# Patient Record
Sex: FEMALE | Race: OTHER | Employment: OTHER | URBAN - METROPOLITAN AREA
[De-identification: names, ages, dates, MRNs, and addresses within clinical notes are randomized per-mention and may not be internally consistent; named-entity substitution may affect disease eponyms.]

---

## 2017-09-11 ENCOUNTER — HOSPITAL ENCOUNTER (INPATIENT)
Age: 82
LOS: 2 days | Discharge: HOME | DRG: 378 | End: 2017-09-13
Attending: EMERGENCY MEDICINE | Admitting: INTERNAL MEDICINE
Payer: MEDICARE

## 2017-09-11 DIAGNOSIS — K62.5 RECTAL BLEEDING: Primary | ICD-10-CM

## 2017-09-11 PROBLEM — K92.2 ACUTE LOWER GASTROINTESTINAL BLEEDING: Status: ACTIVE | Noted: 2017-09-11

## 2017-09-11 LAB
ALBUMIN SERPL-MCNC: 2.3 G/DL (ref 3.2–4.6)
ALBUMIN/GLOB SERPL: 0.5 {RATIO} (ref 1.2–3.5)
ALP SERPL-CCNC: 93 U/L (ref 50–136)
ALT SERPL-CCNC: 19 U/L (ref 12–65)
ANION GAP SERPL CALC-SCNC: 10 MMOL/L (ref 7–16)
AST SERPL-CCNC: 20 U/L (ref 15–37)
BASOPHILS # BLD: 0.1 K/UL (ref 0–0.2)
BASOPHILS NFR BLD: 1 % (ref 0–2)
BILIRUB SERPL-MCNC: 0.2 MG/DL (ref 0.2–1.1)
BUN SERPL-MCNC: 25 MG/DL (ref 8–23)
CALCIUM SERPL-MCNC: 8.3 MG/DL (ref 8.3–10.4)
CHLORIDE SERPL-SCNC: 108 MMOL/L (ref 98–107)
CO2 SERPL-SCNC: 26 MMOL/L (ref 21–32)
CREAT SERPL-MCNC: 1.21 MG/DL (ref 0.6–1)
DIFFERENTIAL METHOD BLD: ABNORMAL
EOSINOPHIL # BLD: 1.2 K/UL (ref 0–0.8)
EOSINOPHIL NFR BLD: 10 % (ref 0.5–7.8)
ERYTHROCYTE [DISTWIDTH] IN BLOOD BY AUTOMATED COUNT: 16.7 % (ref 11.9–14.6)
GLOBULIN SER CALC-MCNC: 4.3 G/DL (ref 2.3–3.5)
GLUCOSE SERPL-MCNC: 142 MG/DL (ref 65–100)
HCT VFR BLD AUTO: 28.4 % (ref 35.8–46.3)
HGB BLD-MCNC: 8.8 G/DL (ref 11.7–15.4)
IMM GRANULOCYTES # BLD: 0 K/UL (ref 0–0.5)
IMM GRANULOCYTES NFR BLD: 0.2 % (ref 0–5)
LYMPHOCYTES # BLD: 2.3 K/UL (ref 0.5–4.6)
LYMPHOCYTES NFR BLD: 19 % (ref 13–44)
MCH RBC QN AUTO: 23 PG (ref 26.1–32.9)
MCHC RBC AUTO-ENTMCNC: 31 G/DL (ref 31.4–35)
MCV RBC AUTO: 74.3 FL (ref 79.6–97.8)
MONOCYTES # BLD: 0.8 K/UL (ref 0.1–1.3)
MONOCYTES NFR BLD: 7 % (ref 4–12)
NEUTS SEG # BLD: 7.8 K/UL (ref 1.7–8.2)
NEUTS SEG NFR BLD: 63 % (ref 43–78)
PLATELET # BLD AUTO: 349 K/UL (ref 150–450)
PMV BLD AUTO: 9.4 FL (ref 10.8–14.1)
POTASSIUM SERPL-SCNC: 4.4 MMOL/L (ref 3.5–5.1)
PROT SERPL-MCNC: 6.6 G/DL (ref 6.3–8.2)
RBC # BLD AUTO: 3.82 M/UL (ref 4.05–5.25)
SODIUM SERPL-SCNC: 144 MMOL/L (ref 136–145)
WBC # BLD AUTO: 12.1 K/UL (ref 4.3–11.1)

## 2017-09-11 PROCEDURE — 99284 EMERGENCY DEPT VISIT MOD MDM: CPT | Performed by: EMERGENCY MEDICINE

## 2017-09-11 PROCEDURE — 99283 EMERGENCY DEPT VISIT LOW MDM: CPT | Performed by: EMERGENCY MEDICINE

## 2017-09-11 PROCEDURE — 85025 COMPLETE CBC W/AUTO DIFF WBC: CPT | Performed by: EMERGENCY MEDICINE

## 2017-09-11 PROCEDURE — 93005 ELECTROCARDIOGRAM TRACING: CPT | Performed by: EMERGENCY MEDICINE

## 2017-09-11 PROCEDURE — 74011250637 HC RX REV CODE- 250/637: Performed by: INTERNAL MEDICINE

## 2017-09-11 PROCEDURE — 74011250636 HC RX REV CODE- 250/636: Performed by: INTERNAL MEDICINE

## 2017-09-11 PROCEDURE — 80053 COMPREHEN METABOLIC PANEL: CPT | Performed by: EMERGENCY MEDICINE

## 2017-09-11 PROCEDURE — 65660000000 HC RM CCU STEPDOWN

## 2017-09-11 PROCEDURE — 77030032490 HC SLV COMPR SCD KNE COVD -B

## 2017-09-11 RX ORDER — SODIUM CHLORIDE 9 MG/ML
75 INJECTION, SOLUTION INTRAVENOUS CONTINUOUS
Status: DISCONTINUED | OUTPATIENT
Start: 2017-09-11 | End: 2017-09-13 | Stop reason: HOSPADM

## 2017-09-11 RX ORDER — CLOPIDOGREL BISULFATE 75 MG/1
75 TABLET ORAL DAILY
COMMUNITY

## 2017-09-11 RX ORDER — MIRTAZAPINE 45 MG/1
45 TABLET, FILM COATED ORAL
COMMUNITY

## 2017-09-11 RX ORDER — PROMETHAZINE HYDROCHLORIDE 25 MG/ML
12.5 INJECTION, SOLUTION INTRAMUSCULAR; INTRAVENOUS
Status: DISCONTINUED | OUTPATIENT
Start: 2017-09-11 | End: 2017-09-13 | Stop reason: HOSPADM

## 2017-09-11 RX ORDER — ONDANSETRON 2 MG/ML
4 INJECTION INTRAMUSCULAR; INTRAVENOUS
Status: DISCONTINUED | OUTPATIENT
Start: 2017-09-11 | End: 2017-09-13 | Stop reason: HOSPADM

## 2017-09-11 RX ORDER — FUROSEMIDE 20 MG/1
20 TABLET ORAL DAILY
COMMUNITY

## 2017-09-11 RX ORDER — ASPIRIN 81 MG/1
81 TABLET ORAL DAILY
COMMUNITY

## 2017-09-11 RX ORDER — METOPROLOL TARTRATE 50 MG/1
50 TABLET ORAL 2 TIMES DAILY
COMMUNITY

## 2017-09-11 RX ORDER — RANITIDINE 150 MG/1
150 TABLET, FILM COATED ORAL 2 TIMES DAILY
COMMUNITY

## 2017-09-11 RX ORDER — SODIUM CHLORIDE 0.9 % (FLUSH) 0.9 %
5-10 SYRINGE (ML) INJECTION AS NEEDED
Status: DISCONTINUED | OUTPATIENT
Start: 2017-09-11 | End: 2017-09-13 | Stop reason: HOSPADM

## 2017-09-11 RX ORDER — SODIUM CHLORIDE 0.9 % (FLUSH) 0.9 %
5-10 SYRINGE (ML) INJECTION EVERY 8 HOURS
Status: DISCONTINUED | OUTPATIENT
Start: 2017-09-11 | End: 2017-09-13 | Stop reason: HOSPADM

## 2017-09-11 RX ORDER — ATORVASTATIN CALCIUM 10 MG/1
TABLET, FILM COATED ORAL DAILY
COMMUNITY

## 2017-09-11 RX ORDER — PANTOPRAZOLE SODIUM 40 MG/1
40 TABLET, DELAYED RELEASE ORAL
Status: DISCONTINUED | OUTPATIENT
Start: 2017-09-12 | End: 2017-09-13 | Stop reason: HOSPADM

## 2017-09-11 RX ORDER — QUETIAPINE FUMARATE 100 MG/1
100 TABLET, FILM COATED ORAL
COMMUNITY

## 2017-09-11 RX ORDER — ATORVASTATIN CALCIUM 10 MG/1
10 TABLET, FILM COATED ORAL
Status: DISCONTINUED | OUTPATIENT
Start: 2017-09-11 | End: 2017-09-13 | Stop reason: HOSPADM

## 2017-09-11 RX ORDER — MECLIZINE HCL 12.5 MG 12.5 MG/1
12.5 TABLET ORAL
COMMUNITY

## 2017-09-11 RX ADMIN — ATORVASTATIN CALCIUM 10 MG: 10 TABLET, FILM COATED ORAL at 22:33

## 2017-09-11 RX ADMIN — Medication 10 ML: at 18:25

## 2017-09-11 RX ADMIN — SODIUM CHLORIDE 75 ML/HR: 900 INJECTION, SOLUTION INTRAVENOUS at 18:22

## 2017-09-11 NOTE — IP AVS SNAPSHOT
Summary of Care Report The Summary of Care report has been created to help improve care coordination. Users with access to [employer] or [address] (Web-based application) may access additional patient information including the Discharge Summary. If you are not currently a [address] user and need more information, please call the number listed below in the [place] [address] section and ask to be connected with Medical Records. Facility Information Name Address Phone [address] [address] [address] [phone] Patient Information Patient Name Sex DOMINIQUE Stiles ([id number]) Female 1935 Discharge Information Admitting Provider Service Area Unit Aniya Dailey MD / [address] 2 Surgical / [phone] Discharge Provider Discharge Date/Time Discharge Disposition Destination (none) 2017 (Pending) AHR (none) Patient Language Language Turkish [40] Hospital Problems as of 2017  Never Reviewed Class Noted - Resolved Last Modified POA Active Problems * (Principal)Acute lower gastrointestinal bleeding  2017 - Present 2017 by Tiffany Steve MD Unknown Entered by Aniya Dailey MD  
  Acute blood loss anemia  2017 - Present 2017 by Tiffany Steve MD Yes Entered by Tiffany Steve MD  
  
You are allergic to the following No active allergies Current Discharge Medication List  
  
CONTINUE these medications which have NOT CHANGED Dose & Instructions Dispensing Information Comments  
 aspirin delayed-release 81 mg tablet Dose:  81 mg Take 81 mg by mouth daily. Refills:  0  
   
 clopidogrel 75 mg Tab Commonly known as:  PLAVIX Dose:  75 mg Take 75 mg by mouth daily. Refills:  0 LASIX 20 mg tablet Generic drug:  furosemide Dose:  20 mg Take 20 mg by mouth daily. Refills:  0 LIPITOR 10 mg tablet Generic drug:  atorvastatin Take  by mouth daily. Refills:  0 LOPRESSOR 50 mg tablet Generic drug:  metoprolol tartrate Dose:  50 mg Take 50 mg by mouth two (2) times a day. Refills:  0  
   
 meclizine 12.5 mg tablet Commonly known as:  ANTIVERT Dose:  12.5 mg Take 12.5 mg by mouth three (3) times daily as needed. Refills:  0  
   
 mirtazapine 45 mg tablet Commonly known as:  [place] People Dose:  45 mg Take 45 mg by mouth nightly. Refills:  0 SEROquel 100 mg tablet Generic drug:  QUEtiapine Dose:  100 mg Take 100 mg by mouth nightly. Refills:  0  
   
 ZANTAC 150 mg tablet Generic drug:  raNITIdine Dose:  150 mg Take 150 mg by mouth two (2) times a day. Refills:  0 Follow-up Information Follow up With Details Comments Contact Info Not On File [facility]   Not On File (62) Patient has a PCP but that physician is not listed in [facility]. 
  
 keep your appointment with your GI doctor Discharge Instructions DISCHARGE SUMMARY from Nurse The following personal items are in your possession at time of discharge: 
 
Dental Appliances: None Home Medications: None Jewelry: None Clothing: With patient, Undergarments, Socks, Shirt, Pants, Footwear Other Valuables: At bedside PATIENT INSTRUCTIONS: 
 
After general anesthesia or intravenous sedation, for 24 hours or while taking prescription Narcotics: · Limit your activities · Do not drive and operate hazardous machinery · Do not make important personal or business decisions · Do  not drink alcoholic beverages · If you have not urinated within 8 hours after discharge, please contact your surgeon on call. Report the following to your surgeon: · Excessive pain, swelling, redness or odor of or around the surgical area · Temperature over 100.5 · Nausea and vomiting lasting longer than 4 hours or if unable to take medications · Any signs of decreased circulation or nerve impairment to extremity: change in color, persistent  numbness, tingling, coldness or increase pain · Any questions What to do at Home: 
Recommended activity: Activity as tolerated, If you experience any of the following symptoms fever, chills, new or unrelieved pain, dizziness, chest pain, shortness of breath, bleeding , please follow up with MD. 
 
 
*  Please give a list of your current medications to your Primary Care Provider. *  Please update this list whenever your medications are discontinued, doses are 
    changed, or new medications (including over-the-counter products) are added. *  Please carry medication information at all times in case of emergency situations. These are general instructions for a healthy lifestyle: No smoking/ No tobacco products/ Avoid exposure to second hand smoke Surgeon General's Warning:  Quitting smoking now greatly reduces serious risk to your health. Obesity, smoking, and sedentary lifestyle greatly increases your risk for illness A healthy diet, regular physical exercise & weight monitoring are important for maintaining a healthy lifestyle You may be retaining fluid if you have a history of heart failure or if you experience any of the following symptoms:  Weight gain of 3 pounds or more overnight or 5 pounds in a week, increased swelling in our hands or feet or shortness of breath while lying flat in bed. Please call your doctor as soon as you notice any of these symptoms; do not wait until your next office visit. Recognize signs and symptoms of STROKE: 
 
F-face looks uneven A-arms unable to move or move unevenly S-speech slurred or non-existent T-time-call 911 as soon as signs and symptoms begin-DO NOT go Back to bed or wait to see if you get better-TIME IS BRAIN. Warning Signs of HEART ATTACK Call 911 if you have these symptoms: 
? Chest discomfort. Most heart attacks involve discomfort in the center of the chest that lasts more than a few minutes, or that goes away and comes back. It can feel like uncomfortable pressure, squeezing, fullness, or pain. ? Discomfort in other areas of the upper body. Symptoms can include pain or discomfort in one or both arms, the back, neck, jaw, or stomach. ? Shortness of breath with or without chest discomfort. ? Other signs may include breaking out in a cold sweat, nausea, or lightheadedness. Don't wait more than five minutes to call [address]! Fast action can save your life. Calling 911 is almost always the fastest way to get lifesaving treatment. Emergency Medical Services staff can begin treatment when they arrive  up to an hour sooner than if someone gets to the hospital by car. The discharge information has been reviewed with the patient. The patient verbalized understanding. Discharge medications reviewed with the patient and appropriate educational materials and side effects teaching were provided. Chart Review Routing History No Routing History on File

## 2017-09-11 NOTE — IP AVS SNAPSHOT
Jas Alexander 
 
 
 [address] [facility] [address] [facility] 
[phone] Patient: Savannah Vallejo MRN: [id number] HARSH:[id number] You are allergic to the following No active allergies Recent Documentation Smoking Status Never Assessed Emergency Contacts Name Discharge Info Relation Home Work Mobile Tomas Quesada  Daughter [21] [phone] About your hospitalization You were admitted on:  September 11, 2017 You last received care in the:  [facility] 2 SURGICAL You were discharged on:  September 13, 2017 Unit phone number:  [phone] Why you were hospitalized Your primary diagnosis was:  Acute Lower Gastrointestinal Bleeding Your diagnoses also included:  Acute Blood Loss Anemia Providers Seen During Your Hospitalizations Provider Role Specialty Primary office phone Tor Cabral MD Attending Provider Emergency Medicine [phone] Vijay Arnold MD Attending Provider Internal Medicine [phone] Your Primary Care Physician (PCP) Primary Care Physician Office Phone Office Fax NOT ON FILE ** None ** ** None ** Follow-up Information Follow up With Details Comments Contact Info Not On File Bshsi   Not On File (62) Patient has a PCP but that physician is not listed in [facility]. 
  
 keep your appointment with your GI doctor Current Discharge Medication List  
  
CONTINUE these medications which have NOT CHANGED Dose & Instructions Dispensing Information Comments Morning Noon Evening Bedtime  
 aspirin delayed-release 81 mg tablet Your next dose is:  Tomorrow Morning Dose:  81 mg Take 81 mg by mouth daily. Refills:  0  
     
  
   
   
   
  
 clopidogrel 75 mg Tab Commonly known as:  PLAVIX Your next dose is:  Tomorrow Morning Dose:  75 mg Take 75 mg by mouth daily. Refills:  0  
     
  
   
   
   
  
 LASIX 20 mg tablet Generic drug:  furosemide Your next dose is:  Tomorrow Morning Dose:  20 mg Take 20 mg by mouth daily. Refills:  0 LIPITOR 10 mg tablet Generic drug:  atorvastatin Your next dose is:  Tomorrow Morning Take  by mouth daily. Refills:  0 LOPRESSOR 50 mg tablet Generic drug:  metoprolol tartrate Your next dose is: This evening Dose:  50 mg Take 50 mg by mouth two (2) times a day. Refills:  0  
     
  
   
   
  
   
  
 meclizine 12.5 mg tablet Commonly known as:  ANTIVERT Your next dose is: This evening Dose:  12.5 mg Take 12.5 mg by mouth three (3) times daily as needed. Refills:  0  
     
  
   
  
   
  
   
  
 mirtazapine 45 mg tablet Commonly known as:  [place]lyn [place] Your next dose is: This evening Dose:  45 mg Take 45 mg by mouth nightly. Refills:  0 SEROquel 100 mg tablet Generic drug:  QUEtiapine Your next dose is: This evening Dose:  100 mg Take 100 mg by mouth nightly. Refills:  0  
     
   
   
  
   
  
 ZANTAC 150 mg tablet Generic drug:  raNITIdine Your next dose is: This evening Dose:  150 mg Take 150 mg by mouth two (2) times a day. Refills:  0 Discharge Instructions DISCHARGE SUMMARY from Nurse The following personal items are in your possession at time of discharge: 
 
Dental Appliances: None Home Medications: None Jewelry: None Clothing: With patient, Undergarments, Socks, Shirt, Pants, Footwear Other Valuables: At bedside PATIENT INSTRUCTIONS: 
 
After general anesthesia or intravenous sedation, for 24 hours or while taking prescription Narcotics: · Limit your activities · Do not drive and operate hazardous machinery · Do not make important personal or business decisions · Do  not drink alcoholic beverages · If you have not urinated within 8 hours after discharge, please contact your surgeon on call. Report the following to your surgeon: 
· Excessive pain, swelling, redness or odor of or around the surgical area · Temperature over 100.5 · Nausea and vomiting lasting longer than 4 hours or if unable to take medications · Any signs of decreased circulation or nerve impairment to extremity: change in color, persistent  numbness, tingling, coldness or increase pain · Any questions What to do at Home: 
Recommended activity: Activity as tolerated, If you experience any of the following symptoms fever, chills, new or unrelieved pain, dizziness, chest pain, shortness of breath, bleeding , please follow up with MD. 
 
 
*  Please give a list of your current medications to your Primary Care Provider. *  Please update this list whenever your medications are discontinued, doses are 
    changed, or new medications (including over-the-counter products) are added. *  Please carry medication information at all times in case of emergency situations. These are general instructions for a healthy lifestyle: No smoking/ No tobacco products/ Avoid exposure to second hand smoke Surgeon General's Warning:  Quitting smoking now greatly reduces serious risk to your health. Obesity, smoking, and sedentary lifestyle greatly increases your risk for illness A healthy diet, regular physical exercise & weight monitoring are important for maintaining a healthy lifestyle You may be retaining fluid if you have a history of heart failure or if you experience any of the following symptoms:  Weight gain of 3 pounds or more overnight or 5 pounds in a week, increased swelling in our hands or feet or shortness of breath while lying flat in bed.   Please call your doctor as soon as you notice any of these symptoms; do not wait until your next office visit. Recognize signs and symptoms of STROKE: 
 
F-face looks uneven A-arms unable to move or move unevenly S-speech slurred or non-existent T-time-call 911 as soon as signs and symptoms begin-DO NOT go Back to bed or wait to see if you get better-TIME IS BRAIN. Warning Signs of HEART ATTACK Call 911 if you have these symptoms: 
? Chest discomfort. Most heart attacks involve discomfort in the center of the chest that lasts more than a few minutes, or that goes away and comes back. It can feel like uncomfortable pressure, squeezing, fullness, or pain. ? Discomfort in other areas of the upper body. Symptoms can include pain or discomfort in one or both arms, the back, neck, jaw, or stomach. ? Shortness of breath with or without chest discomfort. ? Other signs may include breaking out in a cold sweat, nausea, or lightheadedness. Don't wait more than five minutes to call [address]! Fast action can save your life. Calling 911 is almost always the fastest way to get lifesaving treatment. Emergency Medical Services staff can begin treatment when they arrive  up to an hour sooner than if someone gets to the hospital by car. The discharge information has been reviewed with the patient. The patient verbalized understanding. Discharge medications reviewed with the patient and appropriate educational materials and side effects teaching were provided. Discharge Instructions Attachments/References GI BLEED (Kazakh) Discharge Orders None [facility] Announcement We are excited to announce that we are making your provider's discharge notes available to you in [place]. You will see these notes when they are completed and signed by the physician that discharged you from your recent hospital stay.   If you have any questions or concerns about any information you see in MyChart, please call the Health Information Department where you were seen or reach out to your Primary Care Provider for more information about your plan of care. Introducing [facility] HEALTH SERVICES! [facility] introduce portal paciente MyChart . Ahora se puede acceder a partes de brasher expediente médico, enviar por correo electrónico la oficina de brasher médico y solicitar renovaciones de medicamentos en línea. En brasher navegador de Internet , [employer] a [email or web address] Jaren clic en el usuario por Yaneli Sarmiento? Eli Ligas clic aquí en la sesión Latisha Lehman. Verá la página de registro [place]. Ingrese brasher código de [facility] Cortney vincenzo y bret aparece a continuación. Usted no tendrá que [employer] código después de antonio completado el proceso de registro . Si usted no se inscribe antes de la fecha de caducidad , debe solicitar un nuevo código. · MyChart Código de acceso : [id number] Expires: 12/11/2017  9:32 AM 
 
Ingresa los últimos cuatro dígitos de brasher Número de Seguro Social ( xxxx ) y fecha de nacimiento ( dd / mm / aaaa ) bret se indica y jaren clic en Enviar. Usted será llevado a la siguiente página de registro . Crear un ID MyChart . Esta será brasher ID de inicio de sesión de MyChart y no puede ser [place] , por lo que pensar en amparo que es Deneise Pea y fácil de recordar . Crear amparo contraseña MyChart . Usted puede cambiar brasher contraseña en cualquier momento . Ingrese brasher Password Reset de preguntas y Hankins . [place] se puede utilizar en un momento posterior si usted olvida brasher contraseña. Introduzca brasher dirección de correo electrónico . Crystal Maidens recibirá amparo notificación por correo electrónico cuando la nueva información está disponible en MyChart . Felicie Abler clic en Registrarse. Nika Ing stephanie y descargar porciones de brasher expediente médico. 
Jaren clic en el enlace de descarga del menú Resumen para descargar amparo copia portátil de brasher información médica . [employer] , por favor visite la sección de preguntas frecuentes del sitio web MyChart . Kelli Snyder NO es que se utilizará para las necesidades urgentes. Para emergencias médicas , joycelyn al 911 . Ahora disponible en brasher iPhone y Android ! General Information Please provide this summary of care documentation to your next provider. Patient Signature:  ____________________________________________________________ Date:  ____________________________________________________________  
  
Bill Schrader Provider Signature:  ____________________________________________________________ Date:  ____________________________________________________________ More Information Gastrointestinal Bleeding: Care Instructions Your Care Instructions The digestive or gastrointestinal tract goes from the mouth to the anus. It is often called the GI tract. Bleeding can happen anywhere in the GI tract. It may be caused by an ulcer, an infection, or cancer. It may also be caused by medicines such as aspirin or ibuprofen. Light bleeding may not cause any symptoms at first. But if you continue to bleed for a while, you may feel very weak or tired. Sudden, heavy bleeding means you need to see a doctor right away. This kind of bleeding can be very dangerous. But it can usually be cured or controlled. The doctor may do some tests to find the cause of your bleeding. Follow-up care is a key part of your treatment and safety. Be sure to make and go to all appointments, and call your doctor if you are having problems. It's also a good idea to know your test results and keep a list of the medicines you take. How can you care for yourself at home? · Be safe with medicines. Take your medicines exactly as prescribed. Call your doctor if you think you are having a problem with your medicine.  You will get more details on the specific medicines your doctor prescribes. · Do not take aspirin or other anti-inflammatory medicines, such as naproxen (Aleve) or ibuprofen (Advil, Motrin), without talking to your doctor first. Ask your doctor if it is okay to use acetaminophen (Tylenol). · Do not drink alcohol. · The bleeding may make you lose iron. So it's important to eat foods that have a lot of iron. These include red meat, shellfish, poultry, and eggs. They also include beans, raisins, whole-grain breads, and leafy green vegetables. If you want help planning meals, you can make an appointment with a dietitian. When should you call for help? Call 911 anytime you think you may need emergency care. For example, call if: 
· You have sudden, severe belly pain. · You vomit blood or what looks like coffee grounds. · You passed out (lost consciousness). · Your stools are maroon or very bloody. Call your doctor now or seek immediate medical care if: 
· You are dizzy or lightheaded, or you feel like you may faint. · Your stools are black and look like tar, or they have streaks of blood. · You have belly pain. · You vomit or have nausea. · You have trouble swallowing, or it hurts when you swallow. Watch closely for changes in your health, and be sure to contact your doctor if: 
· You do not get better as expected. Where can you learn more? Go to http://ivania-xiang.info/. Enter W472 in the search box to learn more about \"Gastrointestinal Bleeding: Care Instructions. \" Current as of: March 20, 2017 Content Version: 11.3 © 6005-2479 [employer]. Care instructions adapted under license by [employer] (which disclaims liability or warranty for this information). If you have questions about a medical condition or this instruction, always ask your healthcare professional. [address] any warranty or liability for your use of this information. Izzy Max 
 
 
 [address] [facility] [address] [facility] 
[phone] Patient: Nancy Oropeza MRN: [id number] MHB:[id number] Tiene alergias a lo siguiente No tiene alergias Documentación recientes Estatus de tabaquísmo Never Assessed Emergency Contacts Name Discharge Info Relation Home Work Mobile Henry Liu  Daughter [21] [phone] Sobre mathis hospitalización Le admitieron el:  September 11, 2017 Mathis tratamiento más reciente fue el:  [facility] 2 SURGICAL Le dieron de andi el:  September 13, 2017 Número de teléfono de la unidad:  [phone] Por qué le ingresaron Mathis diagnosis primaria es:  Acute Lower Gastrointestinal Bleeding Mathis diagnosis también incluye:  Acute Blood Loss Anemia Proveedores de verse ion piedad hospitalizaciones Personal Médico Rol Especialidad Teléfono principal de la oficina Kaur Payne MD Attending Provider Emergency Medicine [phone] Barbie Chi MD Attending Provider Internal Medicine [phone] Mathis médico de atención primaria (PCP ) Primary Care Physician Office Phone Office Fax NOT ON FILE ** None ** ** None ** Follow-up Information Follow up With Details Comments Contact Info Not On File Bshsi   Not On File (62) Patient has a PCP but that physician is not listed in [facility]. 
  
 keep your appointment with your GI doctor Aprobación de la gestión actual lista de medicamentos CONTINUAR estos medicamentos que no [place] Dosis e instrucciones Información de dispensación Comentarios Morning Noon Evening Bedtime  
 aspirin delayed-release 81 mg tablet Your next dose is:  Tomorrow Morning Dosis:  81 mg Take 81 mg by mouth daily. recargas:  0  
     
  
   
   
   
  
 clopidogrel 75 mg Tab También conocido bret:  PLAVIX Your next dose is:  Tomorrow Morning Dosis:  75 mg Take 75 mg by mouth daily. recargas:  0  
     
  
   
   
   
  
 LASIX 20 mg tablet Medicamento genérico:  furosemide Your next dose is:  Tomorrow Morning Dosis:  20 mg Take 20 mg by mouth daily. recargas:  0 LIPITOR 10 mg tablet Medicamento genérico:  atorvastatin Your next dose is:  Tomorrow Morning Take  by mouth daily. recargas:  0 LOPRESSOR 50 mg tablet Medicamento genérico:  metoprolol tartrate Your next dose is: This evening Dosis:  50 mg Take 50 mg by mouth two (2) times a day. recargas:  0  
     
  
   
   
  
   
  
 meclizine 12.5 mg tablet También conocido bret:  ANTIVERT Your next dose is: This evening Dosis:  12.5 mg Take 12.5 mg by mouth three (3) times daily as needed. recargas:  0  
     
  
   
  
   
  
   
  
 mirtazapine 45 mg tablet También conocido bret:  [place] Your next dose is: This evening Dosis:  45 mg Take 45 mg by mouth nightly. recargas:  0 SEROquel 100 mg tablet Medicamento genérico:  QUEtiapine Your next dose is: This evening Dosis:  100 mg Take 100 mg by mouth nightly. recargas:  0  
     
   
   
  
   
  
 ZANTAC 150 mg tablet Medicamento genérico:  raNITIdine Your next dose is: This evening Dosis:  150 mg Take 150 mg by mouth two (2) times a day. recargas:  0 Discharge Instructions DISCHARGE SUMMARY from Nurse The following personal items are in your possession at time of discharge: 
 
Dental Appliances: None Home Medications: None Jewelry: None Clothing: With patient, Undergarments, Socks, Shirt, Pants, Footwear Other Valuables: At bedside PATIENT INSTRUCTIONS: 
 
 After general anesthesia or intravenous sedation, for 24 hours or while taking prescription Narcotics: · Limit your activities · Do not drive and operate hazardous machinery · Do not make important personal or business decisions · Do  not drink alcoholic beverages · If you have not urinated within 8 hours after discharge, please contact your surgeon on call. Report the following to your surgeon: 
· Excessive pain, swelling, redness or odor of or around the surgical area · Temperature over 100.5 · Nausea and vomiting lasting longer than 4 hours or if unable to take medications · Any signs of decreased circulation or nerve impairment to extremity: change in color, persistent  numbness, tingling, coldness or increase pain · Any questions What to do at Home: 
Recommended activity: Activity as tolerated, If you experience any of the following symptoms fever, chills, new or unrelieved pain, dizziness, chest pain, shortness of breath, bleeding , please follow up with MD. 
 
 
*  Please give a list of your current medications to your Primary Care Provider. *  Please update this list whenever your medications are discontinued, doses are 
    changed, or new medications (including over-the-counter products) are added. *  Please carry medication information at all times in case of emergency situations. These are general instructions for a healthy lifestyle: No smoking/ No tobacco products/ Avoid exposure to second hand smoke Surgeon General's Warning:  Quitting smoking now greatly reduces serious risk to your health. Obesity, smoking, and sedentary lifestyle greatly increases your risk for illness A healthy diet, regular physical exercise & weight monitoring are important for maintaining a healthy lifestyle You may be retaining fluid if you have a history of heart failure or if you experience any of the following symptoms:  Weight gain of 3 pounds or more overnight or 5 pounds in a week, increased swelling in our hands or feet or shortness of breath while lying flat in bed. Please call your doctor as soon as you notice any of these symptoms; do not wait until your next office visit. Recognize signs and symptoms of STROKE: 
 
F-face looks uneven A-arms unable to move or move unevenly S-speech slurred or non-existent T-time-call 911 as soon as signs and symptoms begin-DO NOT go Back to bed or wait to see if you get better-TIME IS BRAIN. Warning Signs of HEART ATTACK Call 911 if you have these symptoms: 
? Chest discomfort. Most heart attacks involve discomfort in the center of the chest that lasts more than a few minutes, or that goes away and comes back. It can feel like uncomfortable pressure, squeezing, fullness, or pain. ? Discomfort in other areas of the upper body. Symptoms can include pain or discomfort in one or both arms, the back, neck, jaw, or stomach. ? Shortness of breath with or without chest discomfort. ? Other signs may include breaking out in a cold sweat, nausea, or lightheadedness. Don't wait more than five minutes to call [address]! Fast action can save your life. Calling 911 is almost always the fastest way to get lifesaving treatment. Emergency Medical Services staff can begin treatment when they arrive  up to an hour sooner than if someone gets to the hospital by car. The discharge information has been reviewed with the patient. The patient verbalized understanding. Discharge medications reviewed with the patient and appropriate educational materials and side effects teaching were provided. Discharge Instructions Attachments/References GI BLEED (Lithuanian) Discharge Orders [employer] [employer] Announcement We are excited to announce that we are making your provider's discharge notes available to you in [employer].   You will see these notes when they are completed and signed by the physician that discharged you from your recent hospital stay. If you have any questions or concerns about any information you see in MyChart, please call the Health Information Department where you were seen or reach out to your Primary Care Provider for more information about your plan of care. Introducing [facility] HEALTH SERVICES! Maxim Ventura introduce portal paciente MyChart . Ahora se puede acceder a partes de brasher expediente médico, enviar por correo electrónico la oficina de brasher médico y solicitar renovaciones de medicamentos en línea. En brasher navegador de Internet , Diana Martinez a [email or web address] Jaren clic en el usuario por Naheed Butter? [place] clic aquí en la sesión [facility]. Verá la página de registro [place]. Ingrese brasher código de [facility] Cortney vincenzo y bret aparece a continuación. Usted no tendrá que [employer] código después de antonio completado el proceso de registro . Si usted no se inscribe antes de la fecha de caducidad , debe solicitar un nuevo código. · MyChart Código de acceso : [id number] Expires: 12/11/2017  9:32 AM 
 
Ingresa los últimos cuatro dígitos de brasher Número de Seguro Social ( xxxx ) y fecha de nacimiento ( dd / mm / aaaa ) bret se indica y jaren clic en Enviar. Usted será llevado a la siguiente página de registro . Crear un ID MyChart . Esta será brasher ID de inicio de sesión de MyChart y no puede ser [place] , por lo que pensar en amparo que es Lizzy Yusef y fácil de recordar . Crear amparo contraseña MyChart . Usted puede cambiar brasher contraseña en cualquier momento . Ingrese brasher Password Reset de preguntas y [place] . [place] se puede utilizar en un momento posterior si usted olvida brasher contraseña. Introduzca brasher dirección de correo electrónico . Jessica Vee recibirá amparo notificación por correo electrónico cuando la nueva información está disponible en MyChart . Cheron Sobia blanco en Registrarse.  Lesly Milling stephanie y descargar porciones de brasher expediente Jermaine blanco en el enlace de descarga del menú Resumen para descargar amparo copia portátil de brasher información médica . Si tiene [employer] , por favor visite la sección de preguntas frecuentes del sitio web MyChart . Recuerde, MyChart NO es que se utilizará para las necesidades urgentes. Para emergencias médicas , llame al 911 . Ahora disponible en brasher iPhone y Android ! General Information Please provide this summary of care documentation to your next provider. Patient Signature:  ____________________________________________________________ Date:  ____________________________________________________________  
  
[place] Brake Provider Signature:  ____________________________________________________________ Date:  ____________________________________________________________ More Information Sangrado gastrointestinal: Instrucciones de cuidado - [ Gastrointestinal Bleeding: Care Instructions ] Instrucciones de cuidado El tubo digestivo o gastrointestinal se extiende desde la boca hasta el ano. El sangrado puede suceder en cualquier punto del tubo digestivo. Puede ser causado por amparo úlcera, amparo infección o cáncer. También puede ser causado por medicamentos bret aspirina o ibuprofeno. Es posible que un sangrado leve no cause síntomas al principio. Anna si sigue sangrando por un tiempo, podría sentirse muy débil o cansado. Un sangrado repentino y abundante significa que debe visitar al médico de inmediato. Renu tipo de sangrado puede ser [employer] Anna, por lo general, se puede curar o controlar. El médico querrá realizar algunas pruebas para determinar la causa del sangrado. La atención de seguimiento es amparo parte clave de brasher tratamiento y seguridad. Asegúrese de hacer y acudir a todas las citas, y llame a brasher médico si está teniendo problemas.  También es amparo buena idea [employer] resultados de los exámenes y mantener amparo lista de los medicamentos que bandar. Cómo puede cuidarse en el hogar? · Sea vignesh con los medicamentos. [place] khushbu medicamentos exactamente bret le fueron recetados. Llame a brasher médico si maynor estar teniendo problemas con brasher medicamento. [place] medicamentos específicos recetados por brasher médico. 
· No tome aspirina ni otros medicamentos antiinflamatorios, bret naproxeno (Aleve) o ibuprofeno (Advil, Motrin) sin hablar antes con brasher médico. Pregúntele a brasher médico si puede mary acetaminofén (Tylenol). · No rafael alcohol. · Es posible que el sangrado le cause deficiencia de pau. Por lo tanto, es importante que coma alimentos que contengan mucho pau. Estos incluyen alisha nieto, mariscos, aves y [place] frijoles (habichuelas), uvas pasas, panes integrales y verduras de [employer]. Si quiere ayuda para planificar khushbu comidas, puede hacer amparo stas con un dietista. Cuándo debe pedir ayuda? Llame al 911 en cualquier momento que considere que necesita atención de [place]. Por ejemplo, llame si: · Tiene dolor abdominal repentino e intenso. · Vomita lori o algo parecido a granos de café molido. · Se desmayó (perdió el conocimiento). · Khushbu heces son de color amarronado rojizo o muy sanguinolentas (con lori). Llame a brasher médico ahora mismo o busque atención médica inmediata si: 
· Se siente mareado o aturdido, o siente que se va a desmayar. · Khushbu heces son negruzcas y parecidas al alquitrán o tienen rastros de [place]. · Tiene dolor abdominal. 
· Vomita o tiene náuseas. · Tiene dificultades para tragar o dolor al hacerlo. Preste especial atención a los cambios en brasher leyla y asegúrese de comunicarse con brasher médico si: 
· No mejora bret se esperaba. Dónde puede encontrar más información en inglés? [place] a http://ivania-xiang.info/.  
Bk Carrasco V875 en la búsqueda para aprender más acerca Almanzar gastrointestinal: Instrucciones de cuidado - [ Gastrointestinal Bleeding: Care Instructions ]. \" 
Revisado: 20 marzo, 2017 Versión del contenido: 11.3 © 0576-8839 Healthwise, Incorporated. Las instrucciones de cuidado fueron adaptadas bajo licencia por [employer] (which disclaims liability or warranty for this information). Si usted tiene [place] afección médica o sobre estas instrucciones, siempre pregunte a brasher profesional de leyla. [place], [place] niega toda garantía o responsabilidad por brasher uso de esta información.
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
## 2017-09-11 NOTE — ED NOTES
TRANSFER - OUT REPORT:    Verbal report given to DANIE Olivier on Eunice Lopez  being transferred to [facility] for routine progression of care       Report consisted of patients Situation, Background, Assessment and   Recommendations(SBAR). Information from the following report(s) SBAR, Kardex, ED Summary, Procedure Summary, Intake/Output and MAR was reviewed with the receiving nurse. Lines:   Peripheral IV 09/11/17 Left Antecubital (Active)   Site Assessment Clean, dry, & intact 9/11/2017  5:04 PM   Phlebitis Assessment 0 9/11/2017  5:04 PM   Infiltration Assessment 0 9/11/2017  5:04 PM   Dressing Status Clean, dry, & intact 9/11/2017  5:04 PM        Opportunity for questions and clarification was provided.       Patient transported with:   [employer]

## 2017-09-11 NOTE — H&P
History and Physical    Patient: Rosibel Guzman MRN: [id number]  SSN: [id number]    YOB: 1935  Age: 80 y.o. Sex: female      Subjective:    I am a native Romanian speaker- Granddaughter used as  as well ( she speaks [place] ). Rosibel Guzman is a 80 y.o. female who has PMH of CAD, Valve replacement, pacemaker, HTN, Alzheimer's dementia who was brought in by family members ( daughter and granddaughter ) after she had > 6 bloody bowel movements today. Family members showed me pictures they took at home. There was a moderate amount of blood clots and bright red blood. They live in [place] and happen to be visiting [place]. According to her  Daughter, this is the 3rd episode of lower GI. Last almost 1 year ago back in [place]. Patient had a recent admission 1 week ago at [place] due to weakness and diarrhea. EGD/Colonoscopy showed diverticular disease. Flagyl given. Upon arrival to ED VS were /67  HR 60  02 97%, Hb 8 ( unknown baseline ). Normal chemistry. Complaining of nausea. GI consulted with possible Endoscopic studies today vs tomorrow. Denies hematemesis, melena, vomiting, weight loss, history of cancer, no recent NSAIDs use. Past Medical History:   Diagnosis Date    CAD (coronary artery disease)     Dementia     Hypertension      Past Surgical History:   Procedure Laterality Date    HX PACEMAKER        History reviewed. No pertinent family history. Social History   Substance Use Topics    Smoking status: Not on file    Smokeless tobacco: Not on file    Alcohol use No      Prior to Admission medications    Not on File        No Known Allergies    Review of Systems:  A comprehensive review of systems was negative except for that written in the History of Present Illness.     Objective:     Vitals:    09/11/17 1531 09/11/17 1645 09/11/17 1659 09/11/17 1702   BP: 145/67 135/66 136/75    Pulse: 60 60 60    Resp: 18 (!) 40 19    Temp:   98.4 °F (36.9 °C)    SpO2: 97% 98% 98% 97%        Physical Exam:  GENERAL: alert, cooperative, no distress, appears stated age  EYE: negative, positive findings: conjunctivae: PALE    LYMPHATIC: Cervical, supraclavicular, and axillary nodes normal.   THROAT & NECK: no erythema or exudates noted. LUNG: clear to auscultation bilaterally  HEART: regular rate and rhythm, S1, S2 normal, no murmur, click, rub or gallop  ABDOMEN: Mild distension, soft, non-tender. Bowel sounds normal. No masses,  no organomegaly  EXTREMITIES:  extremities normal, atraumatic, no cyanosis or edema  SKIN: Normal.  NEUROLOGIC: alert, in no distress   PSYCHIATRIC: non focal  RECTAL: normal findings:  no gross blood. But when I checked bedside commode, I noticed bright red blood. Assessment:     Hospital Problems  Never Reviewed          Codes Class Noted POA    Acute lower gastrointestinal bleeding ICD-10-CM: K92.2  ICD-9-CM: 578.9  9/11/2017 Unknown              Plan:     #Acute lower GI bleeding: hemodynamically stable  Patient with 3rd episode of GI bleeding, last 1 year ago  Diagnosis of diverticular disease  Physical: mild abdominal distension, non tender, no rebound  Hb of 8.8gr  Keep NPO  Continue IVF, PPI  zofran if vomiting   monItior CBC q 6hrs and transfuse if Hb < 7gr  GI following- for Colonoscopy    #CAD / Heart valve replacement  #Pacemaker-  On aspirin and plavix-  Used to be on coumadin but discontinue due to episodes of hematuria  Hold for now  On toprol, hold for now. May resume it if SBP > 160 or DBP > 100mmHg    #Alzheimers dementia: stable    #DVT ppx: venodyne boots.  Not on heparin    FULL CODE     Diet: NPO  Estimated LOS: > 2 MN  Risk assessment: moderate  Plan of care: discussed with family members        Signed By: Lisa Thornton MD     September 11, 2017
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
## 2017-09-12 LAB
ALBUMIN SERPL-MCNC: 2.2 G/DL (ref 3.2–4.6)
ALBUMIN/GLOB SERPL: 0.6 {RATIO} (ref 1.2–3.5)
ALP SERPL-CCNC: 82 U/L (ref 50–136)
ALT SERPL-CCNC: 16 U/L (ref 12–65)
ANION GAP SERPL CALC-SCNC: 8 MMOL/L (ref 7–16)
AST SERPL-CCNC: 17 U/L (ref 15–37)
ATRIAL RATE: 127 BPM
BILIRUB SERPL-MCNC: 0.2 MG/DL (ref 0.2–1.1)
BUN SERPL-MCNC: 30 MG/DL (ref 8–23)
CALCIUM SERPL-MCNC: 8.4 MG/DL (ref 8.3–10.4)
CALCULATED P AXIS, ECG09: 0 DEGREES
CALCULATED R AXIS, ECG10: -72 DEGREES
CALCULATED T AXIS, ECG11: -127 DEGREES
CHLORIDE SERPL-SCNC: 109 MMOL/L (ref 98–107)
CO2 SERPL-SCNC: 27 MMOL/L (ref 21–32)
CREAT SERPL-MCNC: 1.05 MG/DL (ref 0.6–1)
DIAGNOSIS, 93000: NORMAL
ERYTHROCYTE [DISTWIDTH] IN BLOOD BY AUTOMATED COUNT: 16.6 % (ref 11.9–14.6)
ERYTHROCYTE [DISTWIDTH] IN BLOOD BY AUTOMATED COUNT: 16.6 % (ref 11.9–14.6)
ERYTHROCYTE [DISTWIDTH] IN BLOOD BY AUTOMATED COUNT: 16.8 % (ref 11.9–14.6)
ERYTHROCYTE [DISTWIDTH] IN BLOOD BY AUTOMATED COUNT: 19.2 % (ref 11.9–14.6)
GLOBULIN SER CALC-MCNC: 4 G/DL (ref 2.3–3.5)
GLUCOSE SERPL-MCNC: 106 MG/DL (ref 65–100)
HCT VFR BLD AUTO: 23.1 % (ref 35.8–46.3)
HCT VFR BLD AUTO: 23.1 % (ref 35.8–46.3)
HCT VFR BLD AUTO: 23.7 % (ref 35.8–46.3)
HCT VFR BLD AUTO: 34 % (ref 35.8–46.3)
HGB BLD-MCNC: 7.2 G/DL (ref 11.7–15.4)
HGB BLD-MCNC: 7.2 G/DL (ref 11.7–15.4)
HGB BLD-MCNC: 7.3 G/DL (ref 11.7–15.4)
HGB BLD-MCNC: 9.3 G/DL (ref 11.7–15.4)
IRON SATN MFR SERPL: 7 %
IRON SERPL-MCNC: 19 UG/DL (ref 35–150)
MCH RBC QN AUTO: 22.7 PG (ref 26.1–32.9)
MCH RBC QN AUTO: 22.8 PG (ref 26.1–32.9)
MCH RBC QN AUTO: 23.3 PG (ref 26.1–32.9)
MCH RBC QN AUTO: 24.9 PG (ref 26.1–32.9)
MCHC RBC AUTO-ENTMCNC: 27.4 G/DL (ref 31.4–35)
MCHC RBC AUTO-ENTMCNC: 30.8 G/DL (ref 31.4–35)
MCHC RBC AUTO-ENTMCNC: 31.2 G/DL (ref 31.4–35)
MCHC RBC AUTO-ENTMCNC: 31.2 G/DL (ref 31.4–35)
MCV RBC AUTO: 73.1 FL (ref 79.6–97.8)
MCV RBC AUTO: 73.8 FL (ref 79.6–97.8)
MCV RBC AUTO: 74.8 FL (ref 79.6–97.8)
MCV RBC AUTO: 91.2 FL (ref 79.6–97.8)
PLATELET # BLD AUTO: 243 K/UL (ref 150–450)
PLATELET # BLD AUTO: 313 K/UL (ref 150–450)
PLATELET # BLD AUTO: 327 K/UL (ref 150–450)
PLATELET # BLD AUTO: 365 K/UL (ref 150–450)
PMV BLD AUTO: 11.3 FL (ref 10.8–14.1)
PMV BLD AUTO: 9.5 FL (ref 10.8–14.1)
PMV BLD AUTO: 9.6 FL (ref 10.8–14.1)
PMV BLD AUTO: 9.9 FL (ref 10.8–14.1)
POTASSIUM SERPL-SCNC: 4.6 MMOL/L (ref 3.5–5.1)
PROT SERPL-MCNC: 6.2 G/DL (ref 6.3–8.2)
Q-T INTERVAL, ECG07: 412 MS
QRS DURATION, ECG06: 124 MS
QTC CALCULATION (BEZET), ECG08: 418 MS
RBC # BLD AUTO: 3.09 M/UL (ref 4.05–5.25)
RBC # BLD AUTO: 3.16 M/UL (ref 4.05–5.25)
RBC # BLD AUTO: 3.21 M/UL (ref 4.05–5.25)
RBC # BLD AUTO: 3.73 M/UL (ref 4.05–5.25)
SODIUM SERPL-SCNC: 144 MMOL/L (ref 136–145)
TIBC SERPL-MCNC: 266 UG/DL (ref 250–450)
VENTRICULAR RATE, ECG03: 62 BPM
WBC # BLD AUTO: 12 K/UL (ref 4.3–11.1)
WBC # BLD AUTO: 12.5 K/UL (ref 4.3–11.1)
WBC # BLD AUTO: 13.1 K/UL (ref 4.3–11.1)
WBC # BLD AUTO: 9.7 K/UL (ref 4.3–11.1)

## 2017-09-12 PROCEDURE — 83540 ASSAY OF IRON: CPT | Performed by: NURSE PRACTITIONER

## 2017-09-12 PROCEDURE — 36415 COLL VENOUS BLD VENIPUNCTURE: CPT | Performed by: INTERNAL MEDICINE

## 2017-09-12 PROCEDURE — 74011250637 HC RX REV CODE- 250/637: Performed by: INTERNAL MEDICINE

## 2017-09-12 PROCEDURE — 36430 TRANSFUSION BLD/BLD COMPNT: CPT

## 2017-09-12 PROCEDURE — 65660000000 HC RM CCU STEPDOWN

## 2017-09-12 PROCEDURE — 86923 COMPATIBILITY TEST ELECTRIC: CPT | Performed by: NURSE PRACTITIONER

## 2017-09-12 PROCEDURE — 74011250636 HC RX REV CODE- 250/636: Performed by: INTERNAL MEDICINE

## 2017-09-12 PROCEDURE — P9016 RBC LEUKOCYTES REDUCED: HCPCS | Performed by: NURSE PRACTITIONER

## 2017-09-12 PROCEDURE — 30233N1 TRANSFUSION OF NONAUTOLOGOUS RED BLOOD CELLS INTO PERIPHERAL VEIN, PERCUTANEOUS APPROACH: ICD-10-PCS | Performed by: NURSE PRACTITIONER

## 2017-09-12 PROCEDURE — 86900 BLOOD TYPING SEROLOGIC ABO: CPT | Performed by: NURSE PRACTITIONER

## 2017-09-12 PROCEDURE — 80053 COMPREHEN METABOLIC PANEL: CPT | Performed by: INTERNAL MEDICINE

## 2017-09-12 PROCEDURE — 77030012890

## 2017-09-12 PROCEDURE — 85027 COMPLETE CBC AUTOMATED: CPT | Performed by: INTERNAL MEDICINE

## 2017-09-12 RX ORDER — QUETIAPINE FUMARATE 100 MG/1
100 TABLET, FILM COATED ORAL
Status: DISCONTINUED | OUTPATIENT
Start: 2017-09-12 | End: 2017-09-13 | Stop reason: HOSPADM

## 2017-09-12 RX ORDER — DIPHENHYDRAMINE HCL 25 MG
25 CAPSULE ORAL
Status: COMPLETED | OUTPATIENT
Start: 2017-09-12 | End: 2017-09-12

## 2017-09-12 RX ORDER — SODIUM CHLORIDE 9 MG/ML
250 INJECTION, SOLUTION INTRAVENOUS AS NEEDED
Status: DISCONTINUED | OUTPATIENT
Start: 2017-09-12 | End: 2017-09-13 | Stop reason: HOSPADM

## 2017-09-12 RX ORDER — ACETAMINOPHEN 325 MG/1
650 TABLET ORAL
Status: COMPLETED | OUTPATIENT
Start: 2017-09-12 | End: 2017-09-12

## 2017-09-12 RX ORDER — FUROSEMIDE 10 MG/ML
20 INJECTION INTRAMUSCULAR; INTRAVENOUS ONCE
Status: COMPLETED | OUTPATIENT
Start: 2017-09-12 | End: 2017-09-12

## 2017-09-12 RX ORDER — MIRTAZAPINE 15 MG/1
45 TABLET, FILM COATED ORAL
Status: DISCONTINUED | OUTPATIENT
Start: 2017-09-12 | End: 2017-09-13 | Stop reason: HOSPADM

## 2017-09-12 RX ADMIN — ACETAMINOPHEN 650 MG: 325 TABLET ORAL at 13:35

## 2017-09-12 RX ADMIN — SODIUM CHLORIDE 75 ML/HR: 900 INJECTION, SOLUTION INTRAVENOUS at 06:24

## 2017-09-12 RX ADMIN — DIPHENHYDRAMINE HYDROCHLORIDE 25 MG: 25 CAPSULE ORAL at 13:35

## 2017-09-12 RX ADMIN — Medication 10 ML: at 22:49

## 2017-09-12 RX ADMIN — FUROSEMIDE 20 MG: 10 INJECTION, SOLUTION INTRAMUSCULAR; INTRAVENOUS at 16:04

## 2017-09-12 RX ADMIN — ATORVASTATIN CALCIUM 10 MG: 10 TABLET, FILM COATED ORAL at 22:47

## 2017-09-12 RX ADMIN — PANTOPRAZOLE SODIUM 40 MG: 40 TABLET, DELAYED RELEASE ORAL at 06:24

## 2017-09-12 RX ADMIN — QUETIAPINE FUMARATE 100 MG: 100 TABLET, FILM COATED ORAL at 22:48

## 2017-09-12 RX ADMIN — PROMETHAZINE HYDROCHLORIDE 12.5 MG: 25 INJECTION INTRAMUSCULAR; INTRAVENOUS at 21:58

## 2017-09-12 RX ADMIN — MIRTAZAPINE 45 MG: 15 TABLET, FILM COATED ORAL at 22:47

## 2017-09-12 NOTE — PROGRESS NOTES
History and Physical    Patient: Delores Aleman MRN: [id number]  SSN: [id number]    YOB: 1935  Age: 80 y.o. Sex: female      Subjective:    I am a native Urdu speaker- Granddaughter used as  as well ( she speaks Rojean Sarver ). Delores Aleman is a 80 y.o. female who has PMH of CAD, Valve replacement, pacemaker, HTN, Alzheimer's dementia who was brought in by family members ( daughter and granddaughter ) after she had > 6 bloody bowel movements today. Family members showed me pictures they took at home. There was a moderate amount of blood clots and bright red blood. They live in [place] and happen to be visiting [place]. According to her  Daughter, this is the 3rd episode of lower GI. Last almost 1 year ago back in [place]. Patient had a recent admission 1 week ago at [place] due to weakness and diarrhea. EGD/Colonoscopy showed diverticular disease. Flagyl given. Upon arrival to ED VS were /67  HR 60  02 97%, Hb 8 ( unknown baseline ). Normal chemistry. Complaining of nausea. GI consulted with possible Endoscopic studies today vs tomorrow. Denies hematemesis, melena, vomiting, weight loss, history of cancer, no recent NSAIDs use.       09/12/2017- pt seen - no new complaints no further episodes of bleeding - family at bedside- all questions answered    Past Medical History:   Diagnosis Date    CAD (coronary artery disease)     Dementia     Hypertension      Past Surgical History:   Procedure Laterality Date    HX PACEMAKER        History reviewed. No pertinent family history. Social History   Substance Use Topics    Smoking status: Not on file    Smokeless tobacco: Not on file    Alcohol use No      Prior to Admission medications    Medication Sig Start Date End Date Taking? Authorizing Provider   mirtazapine (REMERON) 45 mg tablet Take 45 mg by mouth nightly. Yes Historical Provider   clopidogrel (PLAVIX) 75 mg tab Take 75 mg by mouth daily.    Yes Historical Provider   aspirin delayed-release 81 mg tablet Take 81 mg by mouth daily. Yes Historical Provider   metoprolol tartrate (LOPRESSOR) 50 mg tablet Take 50 mg by mouth two (2) times a day. Yes Historical Provider   raNITIdine (ZANTAC) 150 mg tablet Take 150 mg by mouth two (2) times a day. Yes Historical Provider   furosemide (LASIX) 20 mg tablet Take 20 mg by mouth daily. Yes Historical Provider   atorvastatin (LIPITOR) 10 mg tablet Take  by mouth daily. Yes Historical Provider   QUEtiapine (SEROQUEL) 100 mg tablet Take 100 mg by mouth nightly. Yes Historical Provider   meclizine (ANTIVERT) 12.5 mg tablet Take 12.5 mg by mouth three (3) times daily as needed. Historical Provider        No Known Allergies    Review of Systems:  A comprehensive review of systems was negative except for that written in the History of Present Illness. Objective:     Vitals:    09/12/17 1339 09/12/17 1353 09/12/17 1453 09/12/17 1552   BP: 138/58 139/61 122/48 123/58   Pulse: 61 60 61 60   Resp: 18 18 18 17   Temp: 97.9 °F (36.6 °C) 97.9 °F (36.6 °C) 97.8 °F (36.6 °C) 98 °F (36.7 °C)   SpO2: 99% 99% 99% 100%        Physical Exam:  GENERAL: alert, cooperative, no distress, appears stated age  EYE: negative, positive findings: conjunctivae: PALE    LYMPHATIC: Cervical, supraclavicular, and axillary nodes normal.   THROAT & NECK: no erythema or exudates noted. LUNG: clear to auscultation bilaterally  HEART: regular rate and rhythm, S1, S2 normal, no murmur, click, rub or gallop  ABDOMEN: Mild distension, soft, non-tender. Bowel sounds normal. No masses,  no organomegaly  EXTREMITIES:  extremities normal, atraumatic, no cyanosis or edema  SKIN: Normal.  NEUROLOGIC: alert, in no distress   PSYCHIATRIC: non focal  RECTAL: normal findings:  no gross blood. But when I checked bedside commode, I noticed bright red blood.      Assessment:     Hospital Problems  Never Reviewed          Codes Class Noted POA    Acute lower gastrointestinal bleeding ICD-10-CM: K92.2  ICD-9-CM: 578.9  9/11/2017 Unknown              Plan:     #Acute lower GI bleeding: hemodynamically stable  Gi transfusing 2 units of PRBCS. Patient with 3rd episode of GI bleeding, last 1 year ago  Diagnosis of diverticular disease  Physical: mild abdominal distension, non tender, no rebound  Diet progressed to clears  Continue IVF, PPI  zofran if vomiting   GI following- no plans for intervention if her H&H remains stable    #CAD / Heart valve replacement  #Pacemaker-  Continue to hold - Aspirin and plavix-  Used to be on coumadin but discontinue due to episodes of hematuria- continue to Hold for now  Continue to hold toprol- will re-start if she remains hemodynamically stable    #Alzheimers dementia: stable    #DVT ppx: venodyne boots.  Not on heparin    FULL CODE     Estimated LOS: > 2 MN  Risk assessment: moderate  Plan of care: discussed with family members        Signed By: Nishant Crockett MD     September 12, 2017

## 2017-09-12 NOTE — PROGRESS NOTES
Problem: Falls - Risk of  Goal: *Absence of Falls  Document Quique Fall Risk and appropriate interventions in the flowsheet.    Outcome: Progressing Towards Goal  Fall Risk Interventions:  Mobility Interventions: Bed/chair exit alarm, PT Consult for mobility concerns     Mentation Interventions: Familiar objects from home, Family/sitter at bedside, More frequent rounding, Reorient patient, Toileting rounds     Medication Interventions: Patient to call before getting OOB, Teach patient to arise slowly

## 2017-09-12 NOTE — PROGRESS NOTES
END OF SHIFT NOTE:    INTAKE/OUTPUT  09/11 0701 - 09/12 0700  In: 273 [I.V.:273]  Out: 700 [Urine:700]  Voiding: YES  Catheter: NO  Drain:              Flatus: Patient does have flatus present. Stool:  0 occurrences. Characteristics:  Stool Assessment  Stool Appearance: Bloody  Stool Amount: Small  Stool Source/Status: Rectum    Emesis: 0 occurrences. Characteristics:        VITAL SIGNS  Patient Vitals for the past 12 hrs:   Temp Pulse Resp BP SpO2   09/12/17 0405 98.5 °F (36.9 °C) 60 17 115/61 100 %   09/11/17 2223 98.5 °F (36.9 °C) 71 18 122/53 100 %   09/11/17 1938 98.3 °F (36.8 °C) 81 16 136/67 100 %       Pain Assessment  Pain Intensity 1: 0 (09/12/17 0405)  Pain Location 1: Abdomen     Patient Stated Pain Goal: 0    Ambulating  Yes    Shift report given to oncoming nurse at the bedside.     Beatriz Gomez RN

## 2017-09-12 NOTE — CONSULTS
[facility] Consult Note       Primary GI Physician: GI physician in [facility]    Referring Physician:  Dr. Dulce Sandoval Date:  9/12/2017    Admit Date:  9/11/2017    Chief Complaint:  GIB    Subjective:     History of Present Illness:  Patient is a 80 y.o. female with PMH of (but not limited to) CAD with pacemaker and on anticoagulants, HTN, dementia, history of anemia who is seen in consultation at the request of Dr. Delmi Frank for GIB. Ms. Carly Meyer is a native Lithuanian speaking female who was evacuated to the [place] area from [place] for [facility]. She developed BRRB yesterday. She has a GI in [place] and has had diverticular bleeding in the past. She was admitted to a hospital there last week with weakness, UTI and vaginal infection. She was treated with antibiotics and underwent an EGD which the family tells me was normal. She is scheduled for a colonoscopy the middle of September for anemia by her GI in [place]. She denies any abdominal pain, recent weight loss, N&V or melena. She takes Plavix and ASA at home daily. She denies any other NSAIDs. She denies any tobacco or ETOH. She denies any family history of colon cancer. She has had no further bleeding since her admission. Her Hgb on admission was 8.8 with Hct 28.4, MCV 74.3 and platelets of 338. Her current Hgb on 9/12/17 has dropped to 7.2 with Hct 23.1. *Patient has dementia and is unable to provide much history even with intrepreter. Family is [place] speaking and intreprets for patient. PMH:  Past Medical History:   Diagnosis Date    CAD (coronary artery disease)     Dementia     Hypertension        PSH:  Past Surgical History:   Procedure Laterality Date    HX PACEMAKER         Allergies:  No Known Allergies    Home Medications:  Prior to Admission medications    Medication Sig Start Date End Date Taking? Authorizing Provider   mirtazapine (REMERON) 45 mg tablet Take 45 mg by mouth nightly.    Yes Historical Provider clopidogrel (PLAVIX) 75 mg tab Take 75 mg by mouth daily. Yes Historical Provider   aspirin delayed-release 81 mg tablet Take 81 mg by mouth daily. Yes Historical Provider   meclizine (ANTIVERT) 12.5 mg tablet Take 12.5 mg by mouth three (3) times daily as needed. Yes Historical Provider   metoprolol tartrate (LOPRESSOR) 50 mg tablet Take 50 mg by mouth two (2) times a day. Yes Historical Provider   raNITIdine (ZANTAC) 150 mg tablet Take 150 mg by mouth two (2) times a day. Yes Historical Provider   furosemide (LASIX) 20 mg tablet Take 20 mg by mouth daily. Yes Historical Provider   atorvastatin (LIPITOR) 10 mg tablet Take  by mouth daily. Yes Historical Provider   QUEtiapine (SEROQUEL) 100 mg tablet Take 100 mg by mouth nightly. Yes Historical Provider       Hospital Medications:  Current Facility-Administered Medications   Medication Dose Route Frequency    sodium chloride (NS) flush 5-10 mL  5-10 mL IntraVENous Q8H    sodium chloride (NS) flush 5-10 mL  5-10 mL IntraVENous PRN    atorvastatin (LIPITOR) tablet 10 mg  10 mg Oral QHS    ondansetron (ZOFRAN) injection 4 mg  4 mg IntraVENous Q8H PRN    promethazine (PHENERGAN) injection 12.5 mg  12.5 mg IntraMUSCular Q6H PRN    pantoprazole (PROTONIX) tablet 40 mg  40 mg Oral ACB    0.9% sodium chloride infusion  75 mL/hr IntraVENous CONTINUOUS       Social History:  Social History   Substance Use Topics    Smoking status: Not on file    Smokeless tobacco: Not on file    Alcohol use No         Family History:  History reviewed. No pertinent family history. Review of Systems:  A detailed 10 system ROS is obtained, with pertinent positives as listed above. All others are negative.     Diet:  NPO    Objective:     Physical Exam:  Vitals:  Visit Vitals    /53 (BP 1 Location: Right arm, BP Patient Position: At rest)    Pulse 62    Temp 98.1 °F (36.7 °C)    Resp 17    SpO2 100%     Gen:  Pt is alert, cooperative, no acute distress FAMILY AT BEDSIDE  Skin:  Extremities and face reveal no rashes. HEENT: Sclerae anicteric. Extra-occular muscles are intact. No oral ulcers. No abnormal pigmentation of the lips. The neck is supple. Cardiovascular: Regular rate and rhythm. No murmurs, gallops, or rubs. Respiratory:  Comfortable breathing with no accessory muscle use. Clear breath sounds anteriorly with no wheezes, rales, or rhonchi. GI:  Abdomen nondistended, soft, and nontender. Normal active bowel sounds. No enlargement of the liver or spleen. No masses palpable. Rectal:  Deferred  Musculoskeletal:  No pitting edema of the lower legs. Neurological:  Gross memory appears intact. Patient is alert and oriented. Psychiatric:  Mood appears appropriate with judgement intact. Lymphatic:  No cervical or supraclavicular adenopathy. Laboratory:    Recent Labs      09/12/17   0510  09/12/17   0053  09/11/17   1536   WBC  12.5*  13.1*  12.1*   HGB  7.2*  7.3*  8.8*   HCT  23.1*  23.7*  28.4*   PLT  313  327  349   MCV  74.8*  73.8*  74.3*   NA   --   144  144   K   --   4.6  4.4   CL   --   109*  108*   CO2   --   27  26   BUN   --   30*  25*   CREA   --   1.05*  1.21*   CA   --   8.4  8.3   GLU   --   106*  142*   AP   --   82  93   SGOT   --   17  20   ALT   --   16  19   TBILI   --   0.2  0.2   ALB   --   2.2*  2.3*   TP   --   6.2*  6.6          Assessment:     Active Problems:    Acute lower gastrointestinal bleeding (9/11/2017)    80 y.o. female with PMH of (but not limited to) CAD with pacemaker and on anticoagulants, HTN, dementia, history of anemia who is seen in consultation at the request of Dr. Pola James for GIB. Ms. Carolyn Glaser is a native Georgian speaking female on antiplatelet therapy with LGIB. She has had no further bleeding since admission. She has a history of diverticular bleeding in the past and this is likely recurrent diverticular bleeding.  She has a history of anemia and is undergoing a workup for this in [place] by her GI there. Her Hgb on admission was 8.8 with Hct 28.4, MCV 74.3 and platelets of 446. Her current Hgb on 9/12/17 has dropped to 7.2 with Hct 23.1. Plan: 1. Iron/TIBC  2. Type and Cross and transfuse 2 units PRBC  3. Serial H&H and transfuse as needed  4. Clear liquid diet  5. If she develops recurrent bleeding, recommend an RBC tag scan  6. Continue to hold Plavix for now  7. No plans for endoscopy at this time. Will continue to follow. Alonso Berry. Danielle Oquendo [facility]   [facility]  Patient is seen and examined in collaboration with Dr. Louise Davies. Assessment and plan as per Dr. Louise Davies  I have seen and examined this patient, and agree with above assessment and plan. Now feeling better, no further bleeding. WIll transfuse and monitor  Could DC home in am if stable, and keep plans for colonoscopy this month in [place], where she lives. High fiber diet and adequate fluids to avoid constipation. Likely diverticular bleeding- appears to have resolved.      Any Francois MD

## 2017-09-12 NOTE — PROGRESS NOTES
END OF SHIFT NOTE:    INTAKE/OUTPUT  09/11 0701 - 09/12 0700  In: 273 [I.V.:273]  Out: 700 [Urine:700]  Voiding: YES  Catheter: NO  Drain:        Flatus: Patient does have flatus present. Stool:  0 occurrences. Characteristics:  Stool Assessment  Stool Appearance: Bloody  Stool Amount: Small  Stool Source/Status: Rectum    Emesis: 0 occurrences. Characteristics:        VITAL SIGNS  Patient Vitals for the past 12 hrs:   Temp Pulse Resp BP SpO2   09/12/17 1730 97.7 °F (36.5 °C) 64 18 125/67 100 %   09/12/17 1628 98.7 °F (37.1 °C) 60 16 146/66 100 %   09/12/17 1617 97.2 °F (36.2 °C) 60 16 144/69 99 %   09/12/17 1552 98 °F (36.7 °C) 60 17 123/58 100 %   09/12/17 1453 97.8 °F (36.6 °C) 61 18 122/48 99 %   09/12/17 1353 97.9 °F (36.6 °C) 60 18 139/61 99 %   09/12/17 1339 97.9 °F (36.6 °C) 61 18 138/58 99 %   09/12/17 1150 98 °F (36.7 °C) 63 17 129/52 97 %   09/12/17 0735 98.1 °F (36.7 °C) 62 17 128/53 100 %       Pain Assessment  Pain Intensity 1: 0 (09/12/17 0405)  Pain Location 1: Abdomen     Patient Stated Pain Goal: 0    Ambulating  Yes    Shift report given to oncoming nurse at the bedside.     Jose Douglass RN

## 2017-09-13 VITALS
RESPIRATION RATE: 18 BRPM | HEART RATE: 60 BPM | SYSTOLIC BLOOD PRESSURE: 159 MMHG | TEMPERATURE: 98.3 F | OXYGEN SATURATION: 100 % | DIASTOLIC BLOOD PRESSURE: 78 MMHG

## 2017-09-13 PROBLEM — D62 ACUTE BLOOD LOSS ANEMIA: Status: ACTIVE | Noted: 2017-09-13

## 2017-09-13 LAB
ABO + RH BLD: NORMAL
BLD PROD TYP BPU: NORMAL
BLD PROD TYP BPU: NORMAL
BLOOD GROUP ANTIBODIES SERPL: NORMAL
BPU ID: NORMAL
BPU ID: NORMAL
CROSSMATCH RESULT,%XM: NORMAL
CROSSMATCH RESULT,%XM: NORMAL
ERYTHROCYTE [DISTWIDTH] IN BLOOD BY AUTOMATED COUNT: 16.4 % (ref 11.9–14.6)
ERYTHROCYTE [DISTWIDTH] IN BLOOD BY AUTOMATED COUNT: 16.6 % (ref 11.9–14.6)
ERYTHROCYTE [DISTWIDTH] IN BLOOD BY AUTOMATED COUNT: 16.7 % (ref 11.9–14.6)
HCT VFR BLD AUTO: 28 % (ref 35.8–46.3)
HCT VFR BLD AUTO: 30 % (ref 35.8–46.3)
HCT VFR BLD AUTO: 30.1 % (ref 35.8–46.3)
HGB BLD-MCNC: 9 G/DL (ref 11.7–15.4)
HGB BLD-MCNC: 9.4 G/DL (ref 11.7–15.4)
HGB BLD-MCNC: 9.9 G/DL (ref 11.7–15.4)
MCH RBC QN AUTO: 24.5 PG (ref 26.1–32.9)
MCH RBC QN AUTO: 24.7 PG (ref 26.1–32.9)
MCH RBC QN AUTO: 25.2 PG (ref 26.1–32.9)
MCHC RBC AUTO-ENTMCNC: 31.3 G/DL (ref 31.4–35)
MCHC RBC AUTO-ENTMCNC: 32.1 G/DL (ref 31.4–35)
MCHC RBC AUTO-ENTMCNC: 32.9 G/DL (ref 31.4–35)
MCV RBC AUTO: 76.6 FL (ref 79.6–97.8)
MCV RBC AUTO: 76.9 FL (ref 79.6–97.8)
MCV RBC AUTO: 78.1 FL (ref 79.6–97.8)
PLATELET # BLD AUTO: 251 K/UL (ref 150–450)
PLATELET # BLD AUTO: 255 K/UL (ref 150–450)
PLATELET # BLD AUTO: 262 K/UL (ref 150–450)
PMV BLD AUTO: 9.4 FL (ref 10.8–14.1)
PMV BLD AUTO: 9.6 FL (ref 10.8–14.1)
PMV BLD AUTO: 9.9 FL (ref 10.8–14.1)
RBC # BLD AUTO: 3.64 M/UL (ref 4.05–5.25)
RBC # BLD AUTO: 3.84 M/UL (ref 4.05–5.25)
RBC # BLD AUTO: 3.93 M/UL (ref 4.05–5.25)
SPECIMEN EXP DATE BLD: NORMAL
STATUS OF UNIT,%ST: NORMAL
STATUS OF UNIT,%ST: NORMAL
UNIT DIVISION, %UDIV: 0
UNIT DIVISION, %UDIV: 0
WBC # BLD AUTO: 10.9 K/UL (ref 4.3–11.1)
WBC # BLD AUTO: 9.5 K/UL (ref 4.3–11.1)
WBC # BLD AUTO: 9.7 K/UL (ref 4.3–11.1)

## 2017-09-13 PROCEDURE — 85027 COMPLETE CBC AUTOMATED: CPT | Performed by: INTERNAL MEDICINE

## 2017-09-13 PROCEDURE — 74011250637 HC RX REV CODE- 250/637: Performed by: INTERNAL MEDICINE

## 2017-09-13 PROCEDURE — 36415 COLL VENOUS BLD VENIPUNCTURE: CPT | Performed by: INTERNAL MEDICINE

## 2017-09-13 RX ORDER — METOPROLOL TARTRATE 50 MG/1
50 TABLET ORAL 2 TIMES DAILY
Status: DISCONTINUED | OUTPATIENT
Start: 2017-09-13 | End: 2017-09-13 | Stop reason: HOSPADM

## 2017-09-13 RX ADMIN — PANTOPRAZOLE SODIUM 40 MG: 40 TABLET, DELAYED RELEASE ORAL at 07:29

## 2017-09-13 RX ADMIN — Medication 10 ML: at 06:36

## 2017-09-13 RX ADMIN — METOPROLOL TARTRATE 50 MG: 50 TABLET ORAL at 12:01

## 2017-09-13 NOTE — PROGRESS NOTES
Discharged to home via wheelchair accompanied by several family members. Daughter stated they will drive back home to [facility] today and patient will keep follow up appt with PCP on Sept.19, will have colonoscopy in [facility]. Patient in no distress at time of discharge.

## 2017-09-13 NOTE — PROGRESS NOTES
END OF SHIFT NOTE:    INTAKE/OUTPUT  09/12 0701 - 09/13 0700  In: 2341.9 [I.V.:1734]  Out: 2026 [Urine:2225]  Voiding: YES  Catheter: NO  Drain:              Flatus: Patient does have flatus present. Stool:  0 occurrences. Characteristics:  Stool Assessment  Stool Appearance: Bloody  Stool Amount: Small  Stool Source/Status: Rectum    Emesis: 0 occurrences. Characteristics:        VITAL SIGNS  Patient Vitals for the past 12 hrs:   Temp Pulse Resp BP SpO2   09/13/17 0400 - - - 155/69 -   09/13/17 0300 97.7 °F (36.5 °C) 60 17 189/73 97 %   09/12/17 2300 97.3 °F (36.3 °C) 61 16 155/67 98 %   09/12/17 1830 98 °F (36.7 °C) 67 18 118/68 99 %       Pain Assessment  Pain Intensity 1: 0 (09/12/17 1855)  Pain Location 1: Abdomen     Patient Stated Pain Goal: 0    Ambulating  Yes    Shift report given to oncoming nurse at the bedside.     Mireille Rodriguez RN

## 2017-09-13 NOTE — DISCHARGE INSTRUCTIONS
DISCHARGE SUMMARY from Nurse    The following personal items are in your possession at time of discharge:    Dental Appliances: None        Home Medications: None  Jewelry: None  Clothing: With patient, Undergarments, Socks, Shirt, Pants, Footwear  Other Valuables: At bedside             PATIENT INSTRUCTIONS:    After general anesthesia or intravenous sedation, for 24 hours or while taking prescription Narcotics:  · Limit your activities  · Do not drive and operate hazardous machinery  · Do not make important personal or business decisions  · Do  not drink alcoholic beverages  · If you have not urinated within 8 hours after discharge, please contact your surgeon on call. Report the following to your surgeon:  · Excessive pain, swelling, redness or odor of or around the surgical area  · Temperature over 100.5  · Nausea and vomiting lasting longer than 4 hours or if unable to take medications  · Any signs of decreased circulation or nerve impairment to extremity: change in color, persistent  numbness, tingling, coldness or increase pain  · Any questions        What to do at Home:  Recommended activity: Activity as tolerated,     If you experience any of the following symptoms fever, chills, new or unrelieved pain, dizziness, chest pain, shortness of breath, bleeding , please follow up with MD.      *  Please give a list of your current medications to your Primary Care Provider. *  Please update this list whenever your medications are discontinued, doses are      changed, or new medications (including over-the-counter products) are added. *  Please carry medication information at all times in case of emergency situations. These are general instructions for a healthy lifestyle:    No smoking/ No tobacco products/ Avoid exposure to second hand smoke    Surgeon General's Warning:  Quitting smoking now greatly reduces serious risk to your health.     Obesity, smoking, and sedentary lifestyle greatly increases your risk for illness    A healthy diet, regular physical exercise & weight monitoring are important for maintaining a healthy lifestyle    You may be retaining fluid if you have a history of heart failure or if you experience any of the following symptoms:  Weight gain of 3 pounds or more overnight or 5 pounds in a week, increased swelling in our hands or feet or shortness of breath while lying flat in bed. Please call your doctor as soon as you notice any of these symptoms; do not wait until your next office visit. Recognize signs and symptoms of STROKE:    F-face looks uneven    A-arms unable to move or move unevenly    S-speech slurred or non-existent    T-time-call 911 as soon as signs and symptoms begin-DO NOT go       Back to bed or wait to see if you get better-TIME IS BRAIN. Warning Signs of HEART ATTACK     Call 911 if you have these symptoms:   Chest discomfort. Most heart attacks involve discomfort in the center of the chest that lasts more than a few minutes, or that goes away and comes back. It can feel like uncomfortable pressure, squeezing, fullness, or pain.  Discomfort in other areas of the upper body. Symptoms can include pain or discomfort in one or both arms, the back, neck, jaw, or stomach.  Shortness of breath with or without chest discomfort.  Other signs may include breaking out in a cold sweat, nausea, or lightheadedness. Don't wait more than five minutes to call 911 - MINUTES MATTER! Fast action can save your life. Calling 911 is almost always the fastest way to get lifesaving treatment. Emergency Medical Services staff can begin treatment when they arrive -- up to an hour sooner than if someone gets to the hospital by car. The discharge information has been reviewed with the patient. The patient verbalized understanding.     Discharge medications reviewed with the patient and appropriate educational materials and side effects teaching were provided.

## 2017-09-13 NOTE — DISCHARGE SUMMARY
Patient ID:  Adonay Diaz  [id number]  80 y.o.  1935  Admit date: 9/11/2017  3:31 PM  Discharge date and time: 9/13/2017  Attending: Wolf Ro MD  PCP:  Not On File [facility]  Treatment Team: Attending Provider: Wolf Ro MD; Consulting Provider: Alexander Ricks MD    Principal Diagnosis Acute lower gastrointestinal bleeding   Principal Problem:    Acute lower gastrointestinal bleeding (9/11/2017)    Active Problems:    Acute blood loss anemia (9/13/2017)             Hospital Course:  Please refer to the admission H&P for details of presentation. In summary, Adonay Diaz is a 80 y.o. female who has PMH of CAD, Valve replacement, pacemaker, HTN, Alzheimer's dementia who was brought in by family members ( daughter and granddaughter ) after she had > 6 bloody bowel movements today. Family members showed pictures they took at home. There was a moderate amount of blood clots and bright red blood. They live in [place] and happen to be visiting [place]. According to her  Daughter, this is the 3rd episode of lower GI. Last almost 1 year ago back in [place]. Patient had a recent admission 1 week ago at [place] due to weakness and diarrhea. EGD/Colonoscopy showed diverticular disease. Flagyl given. Upon arrival to ED VS were /67  HR 60  02 97%, Hb 8 ( unknown baseline ). Normal chemistry. Complaining of nausea. GI consulted with possible Endoscopic studies today vs tomorrow. Denies hematemesis, melena, vomiting, weight loss, history of cancer, no recent NSAIDs use. The pt was admitted and seen by GI. She was transfused 2 units of prbcs. Her Hb has remained stable post transfusion. She has had no further episodes of bleeding. GI has deferred her colonoscopy to her GI doctor in [place]. She is stable for discharge to home today. She can resume her aspirin & plavix marichuy.     Significant Diagnostic Studies:       Labs: Results:       Chemistry Recent Labs      09/12/17   0053  09/11/17   1536   GLU 106*  142*   NA  144  144   K  4.6  4.4   CL  109*  108*   CO2  27  26   BUN  30*  25*   CREA  1.05*  1.21*   CA  8.4  8.3   AGAP  8  10   AP  82  93   TP  6.2*  6.6   ALB  2.2*  2.3*   GLOB  4.0*  4.3*   AGRAT  0.6*  0.5*      CBC w/Diff Recent Labs      09/13/17   0534  09/13/17   0044  09/12/17   1916   09/11/17   1536   WBC  9.5  10.9  9.7   < >  12.1*   RBC  3.64*  3.93*  3.73*   < >  3.82*   HGB  9.0*  9.9*  9.3*   < >  8.8*   HCT  28.0*  30.1*  34.0*   < >  28.4*   PLT  251  255  243   < >  349   GRANS   --    --    --    --   63   LYMPH   --    --    --    --   19   EOS   --    --    --    --   10*    < > = values in this interval not displayed. Cardiac Enzymes No results for input(s): CPK, CKND1, DAKOTAH in the last 72 hours. No lab exists for component: CKRMB, TROIP   Coagulation No results for input(s): PTP, INR, APTT in the last 72 hours. No lab exists for component: INREXT    Lipid Panel No results found for: CHOL, CHOLPOCT, CHOLX, CHLST, CHOLV, 185191, HDL, LDL, LDLC, DLDLP, 440135, VLDLC, VLDL, TGLX, TRIGL, TRIGP, TGLPOCT, CHHD, CHHDX   BNP No results for input(s): BNPP in the last 72 hours. Liver Enzymes Recent Labs      09/12/17   0053   TP  6.2*   ALB  2.2*   AP  82   SGOT  17      Thyroid Studies No results found for: T4, T3U, TSH, TSHEXT       Results for orders placed or performed during the hospital encounter of 09/11/17   EKG, 12 LEAD, INITIAL   Result Value Ref Range    Ventricular Rate 62 BPM    Atrial Rate 127 BPM    QRS Duration 124 ms    Q-T Interval 412 ms    QTC Calculation (Bezet) 418 ms    Calculated P Axis 0 degrees    Calculated R Axis -72 degrees    Calculated T Axis -127 degrees    Diagnosis       atrial flutter V paced rhythm  Confirmed by TASNEEM AC (), Saran Ching ([id number]) on 9/12/2017 7:35:18 AM       CT Results (most recent):  No results found for this or any previous visit. VAS/US Results (most recent):  No results found for this or any previous visit.   XR Results (most recent):  No results found for this or any previous visit. Discharge Exam:  Visit Vitals    /78    Pulse 60    Temp 98.3 °F (36.8 °C)    Resp 18    SpO2 100%     General appearance: alert, cooperative, no distress, appears stated age  Lungs: clear to auscultation bilaterally  Heart: regular rate and rhythm, S1, S2 normal, no murmur, click, rub or gallop  Abdomen: soft, non-tender. Bowel sounds normal. No masses,  no organomegaly  Extremities: no cyanosis or edema  Neurologic: Grossly normal    Disposition: home  Discharge Condition: stable  Patient Instructions:   Current Discharge Medication List      CONTINUE these medications which have NOT CHANGED    Details   mirtazapine (REMERON) 45 mg tablet Take 45 mg by mouth nightly. clopidogrel (PLAVIX) 75 mg tab Take 75 mg by mouth daily. aspirin delayed-release 81 mg tablet Take 81 mg by mouth daily. metoprolol tartrate (LOPRESSOR) 50 mg tablet Take 50 mg by mouth two (2) times a day. raNITIdine (ZANTAC) 150 mg tablet Take 150 mg by mouth two (2) times a day. furosemide (LASIX) 20 mg tablet Take 20 mg by mouth daily. atorvastatin (LIPITOR) 10 mg tablet Take  by mouth daily. QUEtiapine (SEROQUEL) 100 mg tablet Take 100 mg by mouth nightly. meclizine (ANTIVERT) 12.5 mg tablet Take 12.5 mg by mouth three (3) times daily as needed. Activity: as tolerated  Diet: cardiac gi soft.   Wound Care: none    Follow-up  ·   PCP in 1week   Time spent to discharge patient greater than 30 minutes  Signed:  Shelley Woodard MD  9/13/2017  11:58 AM

## 2017-09-13 NOTE — PROGRESS NOTES
GI DAILY PROGRESS NOTE    Admit Date:  9/11/2017    Today's Date:  9/13/2017    CC:  GIB    Subjective:     Patient: No bleeding since admission. Denies any abdominal pain, N&V, GERD. Family at bedside to interpret. Medications:   Current Facility-Administered Medications   Medication Dose Route Frequency    0.9% sodium chloride infusion 250 mL  250 mL IntraVENous PRN    mirtazapine (REMERON) tablet 45 mg  45 mg Oral QHS    QUEtiapine (SEROquel) tablet 100 mg  100 mg Oral QHS    sodium chloride (NS) flush 5-10 mL  5-10 mL IntraVENous Q8H    sodium chloride (NS) flush 5-10 mL  5-10 mL IntraVENous PRN    atorvastatin (LIPITOR) tablet 10 mg  10 mg Oral QHS    ondansetron (ZOFRAN) injection 4 mg  4 mg IntraVENous Q8H PRN    promethazine (PHENERGAN) injection 12.5 mg  12.5 mg IntraMUSCular Q6H PRN    pantoprazole (PROTONIX) tablet 40 mg  40 mg Oral ACB    0.9% sodium chloride infusion  75 mL/hr IntraVENous CONTINUOUS       Review of Systems:  ROS was obtained, with pertinent positives as listed above. No chest pain or SOB. Diet:  Clear liquid diet    Objective:   Vitals:  Visit Vitals    /79    Pulse 60    Temp 98.2 °F (36.8 °C)    Resp 18    SpO2 100%     Intake/Output:     09/11 1901 - 09/13 0700  In: 2614.9 [I.V.:2007]  Out: 2925 [Urine:2925]  Exam:  General appearance: alert, cooperative, no distress Resting comfortably. Family at bedside;   Lungs: clear to auscultation bilaterally anteriorly  Heart: regular rate and rhythm  Abdomen: soft, non-tender.  Bowel sounds normal. No masses, no organomegaly  Extremities: extremities normal, atraumatic, no cyanosis or edema  Neuro:  Alert but confused    Data Review (Labs):    Recent Labs      09/13/17   0534  09/13/17   0044  09/12/17   1916  09/12/17   1050  09/12/17   0510  09/12/17   0053  09/11/17   1536   WBC  9.5  10.9  9.7  12.0*  12.5*  13.1*  12.1*   HGB  9.0*  9.9*  9.3*  7.2*  7.2*  7.3*  8.8*   HCT  28.0*  30.1*  34.0*  23.1*  23.1* 23.7*  28.4*   PLT  251  255  243  365  313  327  349   MCV  76.9*  76.6*  91.2  73.1*  74.8*  73.8*  74.3*   NA   --    --    --    --    --   144  144   K   --    --    --    --    --   4.6  4.4   CL   --    --    --    --    --   109*  108*   CO2   --    --    --    --    --   27  26   BUN   --    --    --    --    --   30*  25*   CREA   --    --    --    --    --   1.05*  1.21*   CA   --    --    --    --    --   8.4  8.3   GLU   --    --    --    --    --   106*  142*   AP   --    --    --    --    --   82  93   SGOT   --    --    --    --    --   17  20   ALT   --    --    --    --    --   16  19   TBILI   --    --    --    --    --   0.2  0.2   ALB   --    --    --    --    --   2.2*  2.3*   TP   --    --    --    --    --   6.2*  6.6       Assessment:     Active Problems:    Acute lower gastrointestinal bleeding (9/11/2017)    80 y.o. Female who we are following for GIB. Ms. Marcell Smart is a native Nepali speaking female on antiplatelet therapy with LGIB. She has had no further bleeding since admission. She has a history of diverticular bleeding in the past and likely had a recurrent diverticular bleeding,now resolved. She has a history of iron deficiency anemia and is undergoing a workup for this in [place] by her GI there. Her Hgb on admission was 8.8 with Hct 28.4, MCV 74.3 and platelets of 533. Her current Hgb on 9/12/17 has dropped to 7.2 with Hct 23.1. Now 9/13/17 9.0 s/p transfusion.        Plan: 1. Recommend discharge home, but defer to primary team  2. Advance diet as tolerated  3. Keep appt with GI in [place] for colonoscopy as scheduled. Wilfrido Ocampo.  Catalina Stock in collaboration with Dr. Denia Haines  [facility]